# Patient Record
Sex: MALE | Race: WHITE
[De-identification: names, ages, dates, MRNs, and addresses within clinical notes are randomized per-mention and may not be internally consistent; named-entity substitution may affect disease eponyms.]

---

## 2020-01-10 ENCOUNTER — HOSPITAL ENCOUNTER (EMERGENCY)
Dept: HOSPITAL 8 - ED | Age: 65
Discharge: HOME | End: 2020-01-10
Payer: SELF-PAY

## 2020-01-10 VITALS — HEIGHT: 73 IN | WEIGHT: 178.13 LBS | BODY MASS INDEX: 23.61 KG/M2

## 2020-01-10 VITALS — DIASTOLIC BLOOD PRESSURE: 88 MMHG | SYSTOLIC BLOOD PRESSURE: 136 MMHG
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
## 2020-01-10 DIAGNOSIS — I10: ICD-10-CM
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
## 2020-01-10 DIAGNOSIS — J00: ICD-10-CM

## 2020-01-10 DIAGNOSIS — S39.012A: Primary | ICD-10-CM
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
## 2020-01-10 PROCEDURE — 71046 X-RAY EXAM CHEST 2 VIEWS: CPT

## 2020-01-10 PROCEDURE — 99283 EMERGENCY DEPT VISIT LOW MDM: CPT

## 2020-01-10 PROCEDURE — 96372 THER/PROPH/DIAG INJ SC/IM: CPT

## 2020-01-12 ENCOUNTER — HOSPITAL ENCOUNTER (EMERGENCY)
Dept: HOSPITAL 8 - ED | Age: 65
Discharge: HOME | End: 2020-01-12
Payer: SELF-PAY

## 2020-01-12 VITALS — DIASTOLIC BLOOD PRESSURE: 101 MMHG | SYSTOLIC BLOOD PRESSURE: 149 MMHG

## 2020-01-12 VITALS — WEIGHT: 173.5 LBS | BODY MASS INDEX: 22.99 KG/M2 | HEIGHT: 73 IN

## 2020-01-12 DIAGNOSIS — F17.210: ICD-10-CM
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
## 2020-01-12 DIAGNOSIS — J06.9: ICD-10-CM
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
## 2020-01-12 DIAGNOSIS — I10: ICD-10-CM

## 2020-01-12 DIAGNOSIS — S39.012A: Primary | ICD-10-CM

## 2020-01-12 PROCEDURE — 99283 EMERGENCY DEPT VISIT LOW MDM: CPT

## 2020-01-12 PROCEDURE — 96372 THER/PROPH/DIAG INJ SC/IM: CPT

## 2021-08-01 ENCOUNTER — HOSPITAL ENCOUNTER (EMERGENCY)
Dept: HOSPITAL 8 - ED | Age: 66
Discharge: HOME | End: 2021-08-01
Payer: MEDICAID

## 2021-08-01 VITALS — WEIGHT: 170.35 LBS | BODY MASS INDEX: 23.07 KG/M2 | HEIGHT: 72 IN

## 2021-08-01 VITALS — DIASTOLIC BLOOD PRESSURE: 81 MMHG | SYSTOLIC BLOOD PRESSURE: 127 MMHG

## 2021-08-01 DIAGNOSIS — I10: ICD-10-CM

## 2021-08-01 DIAGNOSIS — F17.210: ICD-10-CM

## 2021-08-01 DIAGNOSIS — L03.115: ICD-10-CM

## 2021-08-01 DIAGNOSIS — S80.811A: ICD-10-CM

## 2021-08-01 DIAGNOSIS — L03.116: ICD-10-CM
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
## 2021-08-01 DIAGNOSIS — S80.812A: Primary | ICD-10-CM
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
## 2021-08-01 LAB
ALBUMIN SERPL-MCNC: 2.9 G/DL (ref 3.4–5)
ANION GAP SERPL CALC-SCNC: 6 MMOL/L (ref 5–15)
BASOPHILS # BLD AUTO: 0.1 X10^3/UL (ref 0–0.1)
BASOPHILS NFR BLD AUTO: 1 % (ref 0–1)
CALCIUM SERPL-MCNC: 8.3 MG/DL (ref 8.5–10.1)
CHLORIDE SERPL-SCNC: 102 MMOL/L (ref 98–107)
CREAT SERPL-MCNC: 1.07 MG/DL (ref 0.7–1.3)
EOSINOPHIL # BLD AUTO: 0.1 X10^3/UL (ref 0–0.4)
EOSINOPHIL NFR BLD AUTO: 1 % (ref 1–7)
ERYTHROCYTE [DISTWIDTH] IN BLOOD BY AUTOMATED COUNT: 13.2 % (ref 9.4–14.8)
LYMPHOCYTES # BLD AUTO: 0.8 X10^3/UL (ref 1–3.4)
LYMPHOCYTES NFR BLD AUTO: 9 % (ref 22–44)
MCH RBC QN AUTO: 29.9 PG (ref 27.5–34.5)
MCHC RBC AUTO-ENTMCNC: 34.1 G/DL (ref 33.2–36.2)
MONOCYTES # BLD AUTO: 1.3 X10^3/UL (ref 0.2–0.8)
MONOCYTES NFR BLD AUTO: 15 % (ref 2–9)
NEUTROPHILS # BLD AUTO: 6.7 X10^3/UL (ref 1.8–6.8)
NEUTROPHILS NFR BLD AUTO: 75 % (ref 42–75)
PLATELET # BLD AUTO: 254 X10^3/UL (ref 130–400)
PMV BLD AUTO: 9.4 FL (ref 7.4–10.4)
RBC # BLD AUTO: 4.21 X10^6/UL (ref 4.38–5.82)

## 2021-08-01 PROCEDURE — 80048 BASIC METABOLIC PNL TOTAL CA: CPT

## 2021-08-01 PROCEDURE — 99406 BEHAV CHNG SMOKING 3-10 MIN: CPT

## 2021-08-01 PROCEDURE — 36415 COLL VENOUS BLD VENIPUNCTURE: CPT

## 2021-08-01 PROCEDURE — 85025 COMPLETE CBC W/AUTO DIFF WBC: CPT

## 2021-08-01 PROCEDURE — 82040 ASSAY OF SERUM ALBUMIN: CPT

## 2021-08-01 NOTE — NUR
Wound care provided to patient's legs. Patient given discharge instructions and 
 prescriptions and they have confirmed that they understand the instructions.  
Patient ambulatory with steady gait and use of cane. NAD, all questions 
answered appropriately, denies additional needs at this time. No personal 
belongings left in room after discharge.
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
## 2021-08-01 NOTE — NUR
PATIENT RESTING IN GURNEY ON PHONE, SIGNIFICANT OTHER AT BEDSIDE. PATIENT 
CONNECTED TO MONITOR, VSS, CALL LIGHT WITHIN REACH.



WAITING FOR LABS TO FINISH RESULTING.

## 2021-09-08 ENCOUNTER — HOSPITAL ENCOUNTER (EMERGENCY)
Dept: HOSPITAL 8 - ED | Age: 66
Discharge: HOME | End: 2021-09-08
Payer: MEDICAID

## 2021-09-08 VITALS — DIASTOLIC BLOOD PRESSURE: 80 MMHG | SYSTOLIC BLOOD PRESSURE: 121 MMHG

## 2021-09-08 VITALS — HEIGHT: 72 IN | WEIGHT: 158.07 LBS | BODY MASS INDEX: 21.41 KG/M2

## 2021-09-08 DIAGNOSIS — L03.116: Primary | ICD-10-CM

## 2021-09-08 DIAGNOSIS — F17.200: ICD-10-CM

## 2021-09-08 DIAGNOSIS — I10: ICD-10-CM

## 2021-09-08 LAB
ALBUMIN SERPL-MCNC: 2.6 G/DL (ref 3.4–5)
ANION GAP SERPL CALC-SCNC: 5 MMOL/L (ref 5–15)
BASOPHILS # BLD AUTO: 0 X10^3/UL (ref 0–0.1)
BASOPHILS NFR BLD AUTO: 1 % (ref 0–1)
CALCIUM SERPL-MCNC: 7.7 MG/DL (ref 8.5–10.1)
CHLORIDE SERPL-SCNC: 102 MMOL/L (ref 98–107)
CREAT SERPL-MCNC: 1.13 MG/DL (ref 0.7–1.3)
EOSINOPHIL # BLD AUTO: 0.1 X10^3/UL (ref 0–0.4)
EOSINOPHIL NFR BLD AUTO: 1 % (ref 1–7)
ERYTHROCYTE [DISTWIDTH] IN BLOOD BY AUTOMATED COUNT: 14.2 % (ref 9.4–14.8)
LYMPHOCYTES # BLD AUTO: 1.5 X10^3/UL (ref 1–3.4)
LYMPHOCYTES NFR BLD AUTO: 27 % (ref 22–44)
MCH RBC QN AUTO: 29.4 PG (ref 27.5–34.5)
MCHC RBC AUTO-ENTMCNC: 33.7 G/DL (ref 33.2–36.2)
MONOCYTES # BLD AUTO: 1 X10^3/UL (ref 0.2–0.8)
MONOCYTES NFR BLD AUTO: 18 % (ref 2–9)
NEUTROPHILS # BLD AUTO: 2.9 X10^3/UL (ref 1.8–6.8)
NEUTROPHILS NFR BLD AUTO: 53 % (ref 42–75)
PLATELET # BLD AUTO: 187 X10^3/UL (ref 130–400)
PMV BLD AUTO: 9.4 FL (ref 7.4–10.4)
RBC # BLD AUTO: 4.14 X10^6/UL (ref 4.38–5.82)

## 2021-09-08 PROCEDURE — 80048 BASIC METABOLIC PNL TOTAL CA: CPT

## 2021-09-08 PROCEDURE — 99284 EMERGENCY DEPT VISIT MOD MDM: CPT

## 2021-09-08 PROCEDURE — 85025 COMPLETE CBC W/AUTO DIFF WBC: CPT

## 2021-09-08 PROCEDURE — 82040 ASSAY OF SERUM ALBUMIN: CPT

## 2021-09-08 PROCEDURE — 36415 COLL VENOUS BLD VENIPUNCTURE: CPT

## 2021-09-20 ENCOUNTER — HOSPITAL ENCOUNTER (OUTPATIENT)
Dept: HOSPITAL 8 - WOUND | Age: 66
Discharge: HOME | End: 2021-09-20
Attending: INTERNAL MEDICINE
Payer: MEDICAID

## 2021-09-20 DIAGNOSIS — I10: ICD-10-CM
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
## 2021-09-20 DIAGNOSIS — G89.29: ICD-10-CM

## 2021-09-20 DIAGNOSIS — L03.116: ICD-10-CM
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
## 2021-09-20 DIAGNOSIS — S81.802A: Primary | ICD-10-CM

## 2021-09-20 DIAGNOSIS — L97.222: ICD-10-CM
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
## 2021-09-20 DIAGNOSIS — F17.210: ICD-10-CM

## 2021-09-20 DIAGNOSIS — Z90.49: ICD-10-CM

## 2021-09-20 PROCEDURE — 97597 DBRDMT OPN WND 1ST 20 CM/<: CPT

## 2021-09-20 PROCEDURE — 97598 DBRDMT OPN WND ADDL 20CM/<: CPT

## 2021-09-20 PROCEDURE — 99215 OFFICE O/P EST HI 40 MIN: CPT
